# Patient Record
Sex: FEMALE | Race: WHITE | NOT HISPANIC OR LATINO | Employment: FULL TIME | URBAN - METROPOLITAN AREA
[De-identification: names, ages, dates, MRNs, and addresses within clinical notes are randomized per-mention and may not be internally consistent; named-entity substitution may affect disease eponyms.]

---

## 2021-03-23 ENCOUNTER — OFFICE VISIT (OUTPATIENT)
Dept: OBSTETRICS AND GYNECOLOGY | Age: 61
End: 2021-03-23

## 2021-03-23 ENCOUNTER — TELEPHONE (OUTPATIENT)
Dept: OBSTETRICS AND GYNECOLOGY | Age: 61
End: 2021-03-23

## 2021-03-23 VITALS
DIASTOLIC BLOOD PRESSURE: 78 MMHG | HEIGHT: 63 IN | WEIGHT: 150.8 LBS | SYSTOLIC BLOOD PRESSURE: 130 MMHG | BODY MASS INDEX: 26.72 KG/M2

## 2021-03-23 DIAGNOSIS — N39.3 STRESS INCONTINENCE IN FEMALE: Primary | ICD-10-CM

## 2021-03-23 DIAGNOSIS — Z01.419 WELL WOMAN EXAM WITH ROUTINE GYNECOLOGICAL EXAM: ICD-10-CM

## 2021-03-23 DIAGNOSIS — Z12.31 SCREENING MAMMOGRAM, ENCOUNTER FOR: ICD-10-CM

## 2021-03-23 DIAGNOSIS — N94.10 FEMALE DYSPAREUNIA: ICD-10-CM

## 2021-03-23 PROBLEM — M25.569 KNEE PAIN: Status: ACTIVE | Noted: 2020-08-04

## 2021-03-23 PROBLEM — E78.5 HYPERLIPIDEMIA: Status: ACTIVE | Noted: 2017-03-03

## 2021-03-23 PROBLEM — I10 HYPERTENSIVE DISORDER: Status: ACTIVE | Noted: 2021-02-01

## 2021-03-23 PROBLEM — K58.9 IRRITABLE BOWEL SYNDROME: Status: ACTIVE | Noted: 2020-08-18

## 2021-03-23 PROBLEM — I49.8 NODAL RHYTHM DISORDER: Status: ACTIVE | Noted: 2017-02-22

## 2021-03-23 PROBLEM — K80.20 BILIARY CALCULUS: Status: ACTIVE | Noted: 2020-09-03

## 2021-03-23 PROBLEM — G47.9 DISTURBANCE IN SLEEP BEHAVIOR: Status: ACTIVE | Noted: 2020-01-13

## 2021-03-23 PROBLEM — K76.89 OTHER CHRONIC NONALCOHOLIC LIVER DISEASE: Status: ACTIVE | Noted: 2021-03-18

## 2021-03-23 PROBLEM — E55.9 VITAMIN D DEFICIENCY: Status: ACTIVE | Noted: 2017-09-15

## 2021-03-23 PROBLEM — H26.9 CATARACT: Status: ACTIVE | Noted: 2017-02-22

## 2021-03-23 PROBLEM — K83.8 CHOLANGIECTASIS: Status: ACTIVE | Noted: 2020-09-08

## 2021-03-23 PROCEDURE — 99213 OFFICE O/P EST LOW 20 MIN: CPT | Performed by: OBSTETRICS & GYNECOLOGY

## 2021-03-23 PROCEDURE — 99386 PREV VISIT NEW AGE 40-64: CPT | Performed by: OBSTETRICS & GYNECOLOGY

## 2021-03-23 RX ORDER — LOSARTAN POTASSIUM 25 MG/1
TABLET ORAL
COMMUNITY

## 2021-03-23 RX ORDER — HYDROCHLOROTHIAZIDE 12.5 MG/1
TABLET ORAL
COMMUNITY
Start: 2020-11-24

## 2021-03-23 RX ORDER — ESTRADIOL 0.1 MG/G
CREAM VAGINAL
Qty: 42.5 G | Refills: 12 | Status: SHIPPED | OUTPATIENT
Start: 2021-03-23 | End: 2022-03-29

## 2021-03-23 RX ORDER — CONJUGATED ESTROGENS 0.62 MG/G
CREAM VAGINAL
Qty: 30 G | Refills: 12 | Status: SHIPPED | OUTPATIENT
Start: 2021-03-23 | End: 2021-03-23

## 2021-03-23 RX ORDER — METOPROLOL SUCCINATE 25 MG/1
TABLET, EXTENDED RELEASE ORAL
COMMUNITY
Start: 2021-02-01

## 2021-03-23 RX ORDER — HYOSCYAMINE SULFATE 0.12 MG/1
0.12 TABLET SUBLINGUAL
COMMUNITY
Start: 2020-11-24

## 2021-03-23 RX ORDER — TRAZODONE HYDROCHLORIDE 50 MG/1
50 TABLET ORAL
COMMUNITY
End: 2023-04-04

## 2021-03-23 RX ORDER — OMEPRAZOLE 40 MG/1
CAPSULE, DELAYED RELEASE ORAL
COMMUNITY
Start: 2020-11-10 | End: 2023-04-04

## 2021-03-23 NOTE — TELEPHONE ENCOUNTER
Patient was prescribed Premarin Vaginal Cream today, and the pharmacy told her it was $399. Would like another Rx that is cheaper. I contacted her Pharmacist, and he said that all hormone creams are very high priced because of all the cases of cancer.He said that the Rx would last her about 6 mo. He said you could try prescribing Estrace instead and see if her insurance will cover that.

## 2021-03-23 NOTE — PROGRESS NOTES
Routine Annual Visit    3/23/2021    Patient: Connie Rogel          MR#:[id number]      Chief Complaint   Patient presents with   • Gynecologic Exam     AE Today-re establish care, Last AE 2019-negative Dr. Loomis, MG 2019-negative @ [facility] over 10yrs ago, Colonoscopy-2020 Dr. Garza. Pt concerned with bladder leaking and having painful intercourse.     • Establish Care       History of Present Illness    61 y.o. female  who presents for annual exam but also has issues  She has stress incontinence, happens when she coughs, laughs, sneezes but not every time and not every day.  Sometimes wears a pad.  She does not feel as though it is problematic enough to warrant surgery at this time.  She also has dryness of the vagina and pain with intercourse.  She has tightening of the vaginal opening.  She has previously tried stretching exercises on her own but without good success.  She also notes vaginal dryness.  She had previously used vaginal estrogen but had stopped      Health Maintenance  Last pap: 2019 normal at [facility]  Mammogram: 2019, history abnormal: None  Colonoscopy , repeat due 10 yr  Family history of Breast, ovarian, uterine, colon, pancreatic cancer: no    No LMP recorded. Patient is postmenopausal.  Obstetric History:  OB History        3    Para   3    Term   3            AB        Living   3       SAB        TAB        Ectopic        Molar        Multiple        Live Births   3               Menstrual History:     No LMP recorded. Patient is postmenopausal.       ________________________________________  There is no problem list on file for this patient.      Past Medical History:   Diagnosis Date   • IBS (irritable bowel syndrome)        Family History   Problem Relation Age of Onset   • Cancer Mother    • Rheum arthritis Father        Past Surgical History:   Procedure Laterality Date   • GALLBLADDER SURGERY  2020   • TUBAL ABDOMINAL LIGATION         Social  "History     Tobacco Use   Smoking Status Former Smoker   Smokeless Tobacco Never Used       has a current medication list which includes the following prescription(s): hydrochlorothiazide, hyoscyamine sulfate sl, losartan, metoprolol succinate xl, metoprolol tartrate, omeprazole, trazodone, and neomycin-polymyxin-hydrocortisone.  ________________________________________      Review of Systems    Objective   Physical Exam    /78   Ht 160 cm (63\")   Wt 68.4 kg (150 lb 12.8 oz)   Breastfeeding No   BMI 26.71 kg/m²    BP Readings from Last 3 Encounters:   03/23/21 130/78   06/29/16 125/82      Wt Readings from Last 3 Encounters:   03/23/21 68.4 kg (150 lb 12.8 oz)   06/29/16 72.6 kg (160 lb)         BMI: Body mass index is 26.71 kg/m².       General:   alert, appears stated age and cooperative   Neck: No thyromegaly or LAD, no carotid bruit noted   Heart:: regular rate and rhythm, S1, S2 normal, no murmur, click, rub or gallop   Lungs: normal respiratory effort and auscultation   Abdomen: soft, non-tender, without masses or organomegaly   Breast: inspection negative, no nipple discharge or bleeding, no masses or nodularity palpable   Urethra and bladder: urethral meatus normal; bladder nontender to palpation; No cystocele   Vulva: normal, Bartholin's, Urethra, [place]'s normal   Vagina:  Atrophic mucosa   Cervix: no lesions and nulliparous appearance   Uterus: normal size or anteverted   Adnexa: normal adnexa and no mass, fullness, tenderness       Assessment:    normal annual exam   Stress incontinence  Vaginal atrophy  Dyspareunia    Plan:    Plan     [x]  Mammogram request made  []  PAP done up-to-date  []  Labs:   []  GC/Chl/TV  []  DEXA scan   []  Referral for colonoscopy:     Referral made to physical therapy for dyspareunia, introital narrowing to discuss vaginal dilator therapy.  Also can discussed exercises to help with stress incontinence, she declines referral to discuss mid urethral sling  Premarin " sent to the pharmacy for vaginal atrophy and dyspareunia as well    Counseling  [x]  Nutrition  [x]  Physical activity/regular exercise   [x]  Healthy weight  []  Injury prevention  []  Smoking cessation  []  Substance misuse/abuse  [x]  Sexual behavior  []  STD prevention  []  Contraception  []  Dental health  [x]  Mental health  []  Immunization  [x]  Encouraged TIFFANY Norton MD  03/23/2021  09:22 EDT

## 2021-04-30 ENCOUNTER — HOSPITAL ENCOUNTER (OUTPATIENT)
Dept: MAMMOGRAPHY | Facility: HOSPITAL | Age: 61
Discharge: HOME | End: 2021-04-30
Admitting: OBSTETRICS & GYNECOLOGY

## 2021-04-30 DIAGNOSIS — Z12.31 SCREENING MAMMOGRAM, ENCOUNTER FOR: ICD-10-CM

## 2021-04-30 PROCEDURE — 77063 BREAST TOMOSYNTHESIS BI: CPT

## 2021-04-30 PROCEDURE — 77067 SCR MAMMO BI INCL CAD: CPT

## 2022-03-29 ENCOUNTER — OFFICE VISIT (OUTPATIENT)
Dept: OBSTETRICS AND GYNECOLOGY | Age: 62
End: 2022-03-29

## 2022-03-29 VITALS
SYSTOLIC BLOOD PRESSURE: 122 MMHG | WEIGHT: 154.6 LBS | HEIGHT: 63 IN | BODY MASS INDEX: 27.39 KG/M2 | DIASTOLIC BLOOD PRESSURE: 74 MMHG

## 2022-03-29 DIAGNOSIS — Z11.51 SCREENING FOR HUMAN PAPILLOMAVIRUS: ICD-10-CM

## 2022-03-29 DIAGNOSIS — N95.2 VAGINAL ATROPHY: ICD-10-CM

## 2022-03-29 DIAGNOSIS — Z12.31 SCREENING MAMMOGRAM FOR BREAST CANCER: ICD-10-CM

## 2022-03-29 DIAGNOSIS — Z01.419 ENCOUNTER FOR GYNECOLOGICAL EXAMINATION WITHOUT ABNORMAL FINDING: Primary | ICD-10-CM

## 2022-03-29 PROCEDURE — 99396 PREV VISIT EST AGE 40-64: CPT | Performed by: OBSTETRICS & GYNECOLOGY

## 2022-03-29 NOTE — PROGRESS NOTES
Routine Annual Visit    3/29/2022    Patient: Connie Rogel          MR#:[id number]      Chief Complaint   Patient presents with   • Gynecologic Exam     AE Today, Last AE 3/23/2021,  MG 2021, Colonoscopy .        History of Present Illness    62 y.o. female  who presents for annual exam. She has had a lot of other issues this year, knee issues from dog running into her knee. Did PT. Also had gallbladder removed this year.   Dx IBS this year and seeing GI  No longer SA, did not go to PT recommended last year   No vaginal bleeding      No LMP recorded. Patient is postmenopausal.  Obstetric History:  OB History        3    Para   3    Term   3            AB        Living   3       SAB        IAB        Ectopic        Molar        Multiple        Live Births   3               Menstrual History:     No LMP recorded. Patient is postmenopausal.       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   • Ex-smoker   • Gastro-esophageal reflux disease without esophagitis   • Hyperlipidemia   • Hypertensive disorder   • Irritable bowel syndrome   • Knee pain   • Menopause   • [place] rhythm disorder   • Other chronic nonalcoholic liver disease   • Vitamin D deficiency       Past Medical History:   Diagnosis Date   • Hypertension    • IBS (irritable bowel syndrome)        Family History   Problem Relation Age of Onset   • Cancer Mother    • Rheum arthritis Father    • Breast cancer Neg Hx    • Ovarian cancer Neg Hx    • Uterine cancer Neg Hx    • Colon cancer Neg Hx        Past Surgical History:   Procedure Laterality Date   • GALLBLADDER SURGERY  2020   • TUBAL ABDOMINAL LIGATION         Social History     Tobacco Use   Smoking Status Former Smoker   Smokeless Tobacco Never Used       has a current medication list which includes the following prescription(s):  "hydrochlorothiazide, hyoscyamine sulfate sl, losartan, metoprolol succinate xl, metoprolol tartrate, omeprazole, estradiol, neomycin-polymyxin-hydrocortisone, and trazodone.  ________________________________________      Review of Systems   Constitutional: Negative for fever and unexpected weight change.   Respiratory: Negative for shortness of breath.    Cardiovascular: Negative for chest pain.   Gastrointestinal: Negative for abdominal pain, constipation and diarrhea.   Genitourinary: Negative for frequency and urgency.   Musculoskeletal: Positive for arthralgias.   Hematological: Negative for adenopathy.   Psychiatric/Behavioral: Negative for dysphoric mood.       Objective   Physical Exam    /74   Ht 160 cm (63\")   Wt 70.1 kg (154 lb 9.6 oz)   Breastfeeding No   BMI 27.39 kg/m²    BP Readings from Last 3 Encounters:   03/29/22 122/74   03/23/21 130/78   06/29/16 125/82      Wt Readings from Last 3 Encounters:   03/29/22 70.1 kg (154 lb 9.6 oz)   03/23/21 68.4 kg (150 lb 12.8 oz)   06/29/16 72.6 kg (160 lb)         BMI: Body mass index is 27.39 kg/m².       General:   alert, appears stated age and cooperative   Neck: No thyromegaly or LAD   Heart:: regular rate and rhythm, S1, S2 normal, no murmur, click, rub or gallop   Lungs: normal respiratory effort and auscultation   Abdomen: soft, non-tender, without masses or organomegaly   Breast: inspection negative, no nipple discharge or bleeding, no masses or nodularity palpable   Urethra and bladder: urethral meatus normal; bladder nontender to palpation;   Vulva: normal, Bartholin's, Urethra, [place]'s normal   Vagina: atrophic mucosa, normal discharge. no blood   Cervix: multiparous appearance and no lesions   Uterus: normal size and anteverted   Adnexa: normal adnexa and no mass, fullness, tenderness       Assessment:    normal annual exam   Mild stress incontinence  menopause    Plan:    Plan     [x]  Mammogram request made  [x]  PAP done  []  Labs:   []  " GC/Chl/TV  []  DEXA scan   []  Referral for colonoscopy:     Declines to see urogyn for stress incontinence    Counseling  [x]  Nutrition  [x]  Physical activity/regular exercise   [x]  Healthy weight  []  Injury prevention  []  Smoking cessation  []  Substance misuse/abuse  [x]  Sexual behavior  []  STD prevention  []  Contraception  []  Dental health  []  Mental health  []  Immunization  [x]  Encouraged SBE        Yolande Norton MD  03/29/2022  11:01 EDT

## 2022-04-03 LAB
CYTOLOGIST CVX/VAG CYTO: NORMAL
CYTOLOGY CVX/VAG DOC CYTO: NORMAL
CYTOLOGY CVX/VAG DOC THIN PREP: NORMAL
DX ICD CODE: NORMAL
HIV 1 & 2 AB SER-IMP: NORMAL
HPV I/H RISK 4 DNA CVX QL PROBE+SIG AMP: NEGATIVE
OTHER STN SPEC: NORMAL
STAT OF ADQ CVX/VAG CYTO-IMP: NORMAL

## 2022-05-02 ENCOUNTER — HOSPITAL ENCOUNTER (OUTPATIENT)
Dept: MAMMOGRAPHY | Facility: HOSPITAL | Age: 62
Discharge: HOME | End: 2022-05-02
Admitting: OBSTETRICS & GYNECOLOGY

## 2022-05-02 DIAGNOSIS — Z12.31 SCREENING MAMMOGRAM FOR BREAST CANCER: ICD-10-CM

## 2022-05-02 PROCEDURE — 77063 BREAST TOMOSYNTHESIS BI: CPT

## 2022-05-02 PROCEDURE — 77067 SCR MAMMO BI INCL CAD: CPT

## 2023-03-22 ENCOUNTER — TRANSCRIBE ORDERS (OUTPATIENT)
Dept: ADMINISTRATIVE | Facility: HOSPITAL | Age: 63
End: 2023-03-22
Payer: COMMERCIAL

## 2023-03-22 DIAGNOSIS — Z12.31 SCREENING MAMMOGRAM, ENCOUNTER FOR: Primary | ICD-10-CM

## 2023-04-04 ENCOUNTER — OFFICE VISIT (OUTPATIENT)
Dept: OBSTETRICS AND GYNECOLOGY | Age: 63
End: 2023-04-04
Payer: COMMERCIAL

## 2023-04-04 VITALS
BODY MASS INDEX: 28.31 KG/M2 | HEIGHT: 63 IN | WEIGHT: 159.8 LBS | SYSTOLIC BLOOD PRESSURE: 128 MMHG | DIASTOLIC BLOOD PRESSURE: 76 MMHG

## 2023-04-04 DIAGNOSIS — Z01.419 WELL WOMAN EXAM WITH ROUTINE GYNECOLOGICAL EXAM: Primary | ICD-10-CM

## 2023-04-04 PROCEDURE — 99396 PREV VISIT EST AGE 40-64: CPT | Performed by: OBSTETRICS & GYNECOLOGY

## 2023-04-04 RX ORDER — AMITRIPTYLINE HYDROCHLORIDE 25 MG/1
25 TABLET, FILM COATED ORAL
COMMUNITY
Start: 2022-04-20 | End: 2023-04-20

## 2023-04-04 RX ORDER — OMEPRAZOLE 20 MG/1
20 CAPSULE, DELAYED RELEASE ORAL
COMMUNITY
Start: 2022-04-20 | End: 2023-04-04

## 2023-04-04 NOTE — PROGRESS NOTES
Routine Annual Visit    2023    Patient: Connie Rogel          MR#:[id number]      Chief Complaint   Patient presents with   • Gynecologic Exam     AE Today, Last AE 3/29/2022 (-), HPV (-), MG 2022, Colonoscopy 2020       History of Present Illness    63 y.o. female  who presents for annual exam.  She notes that her urinary incontinence has improved.  She has not been sexually active.  She denies any vulvovaginal complaints.    Her  retired this year, he is still figuring out what he is going to do with group home.  She is not planning to retire for a couple more years.    Health Maintenance  Last pap:   Mammogram:   Colonoscopy , repeat due 10 yr  Family history of Breast, ovarian, uterine, colon, pancreatic cancer: no      No LMP recorded. Patient is postmenopausal.  Obstetric History:  OB History        3    Para   3    Term   3            AB        Living   3       SAB        IAB        Ectopic        Molar        Multiple        Live Births   3               Menstrual History:     No LMP recorded. Patient is postmenopausal.       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   • Ex-smoker   • Gastro-esophageal reflux disease without esophagitis   • Hyperlipidemia   • Hypertensive disorder   • Irritable bowel syndrome   • Knee pain   • Menopause   • [place] rhythm disorder   • Other chronic nonalcoholic liver disease   • Vitamin D deficiency       Past Medical History:   Diagnosis Date   • Hypertension    • IBS (irritable bowel syndrome)    • Other irritable bowel syndrome        Family History   Problem Relation Age of Onset   • Cancer Mother    • Rheum arthritis Father    • Breast cancer Neg Hx    • Ovarian cancer Neg Hx    • Uterine cancer Neg Hx    • Colon cancer Neg Hx        Past Surgical History:   Procedure Laterality Date   •  "GALLBLADDER SURGERY  08/2020   • TUBAL ABDOMINAL LIGATION         Social History     Tobacco Use   Smoking Status Former   Smokeless Tobacco Never       has a current medication list which includes the following prescription(s): amitriptyline, hydrochlorothiazide, hyoscyamine sulfate sl, loratadine, losartan, and metoprolol succinate xl.  ________________________________________      Review of Systems   Constitutional: Negative for fever and unexpected weight change.   Respiratory: Negative for shortness of breath.    Cardiovascular: Negative for chest pain.   Gastrointestinal: Negative for abdominal pain, constipation and diarrhea.   Genitourinary: Negative for frequency and urgency.   Hematological: Negative for adenopathy.   Psychiatric/Behavioral: Negative for dysphoric mood.       Objective   Physical Exam    /76   Ht 160 cm (63\")   Wt 72.5 kg (159 lb 12.8 oz)   BMI 28.31 kg/m²    BP Readings from Last 3 Encounters:   04/04/23 128/76   03/29/22 122/74   03/23/21 130/78      Wt Readings from Last 3 Encounters:   04/04/23 72.5 kg (159 lb 12.8 oz)   03/29/22 70.1 kg (154 lb 9.6 oz)   03/23/21 68.4 kg (150 lb 12.8 oz)         BMI: Body mass index is 28.31 kg/m².       General:   alert, appears stated age and cooperative   Neck: No thyromegaly or LAD   Heart:: regular rate and rhythm, S1, S2 normal, no murmur, click, rub or gallop   Lungs: normal respiratory effort and auscultation   Abdomen: soft, non-tender, without masses or organomegaly   Breast: inspection negative, no nipple discharge or bleeding, no masses or nodularity palpable   Urethra and bladder: urethral meatus normal; bladder nontender to palpation;   Vulva: normal, Bartholin's, Urethra, [place]'s normal   Vagina: normal but atrophic mucosa   Cervix: multiparous appearance and no lesions   Uterus: normal size, non-tender and anteverted   Adnexa: normal adnexa and no mass, fullness, tenderness       Assessment:    normal annual exam "   Menopause    Plan:    Plan     [x]  Mammogram request made  []  PAP done  []  Labs:   []  GC/Chl/TV  []  DEXA scan   []  Referral for colonoscopy:     Doing well overall, we had discussed treatment for stress incontinence in the past but she says that things have improved.  She is not currently sexually active.    Counseling  [x]  Nutrition  [x]  Physical activity/regular exercise   [x]  Healthy weight  []  Injury prevention  []  Smoking cessation  []  Substance misuse/abuse  [x]  Sexual behavior  []  STD prevention  []  Contraception  []  Dental health  []  Mental health  []  Immunization  [x]  Encouraged SBE        Yolande Norton MD  04/04/2023  16:11 EDT

## 2023-05-23 ENCOUNTER — HOSPITAL ENCOUNTER (OUTPATIENT)
Dept: MAMMOGRAPHY | Facility: HOSPITAL | Age: 63
Discharge: HOME | End: 2023-05-23
Admitting: OBSTETRICS & GYNECOLOGY
Payer: COMMERCIAL

## 2023-05-23 DIAGNOSIS — Z12.31 SCREENING MAMMOGRAM, ENCOUNTER FOR: ICD-10-CM

## 2023-05-23 PROCEDURE — 77063 BREAST TOMOSYNTHESIS BI: CPT

## 2023-05-23 PROCEDURE — 77067 SCR MAMMO BI INCL CAD: CPT

## 2024-04-11 ENCOUNTER — OFFICE VISIT (OUTPATIENT)
Dept: OBSTETRICS AND GYNECOLOGY | Age: 64
End: 2024-04-11
Payer: COMMERCIAL

## 2024-04-11 VITALS
DIASTOLIC BLOOD PRESSURE: 78 MMHG | BODY MASS INDEX: 28.35 KG/M2 | HEIGHT: 63 IN | SYSTOLIC BLOOD PRESSURE: 132 MMHG | WEIGHT: 160 LBS

## 2024-04-11 DIAGNOSIS — Z13.89 SCREENING FOR BLOOD OR PROTEIN IN URINE: ICD-10-CM

## 2024-04-11 DIAGNOSIS — N94.10 FEMALE DYSPAREUNIA: ICD-10-CM

## 2024-04-11 DIAGNOSIS — N95.2 VAGINAL ATROPHY: ICD-10-CM

## 2024-04-11 DIAGNOSIS — N89.8 VAGINAL IRRITATION: ICD-10-CM

## 2024-04-11 DIAGNOSIS — Z01.419 WELL FEMALE EXAM WITH ROUTINE GYNECOLOGICAL EXAM: Primary | ICD-10-CM

## 2024-04-11 DIAGNOSIS — Z12.31 SCREENING MAMMOGRAM FOR BREAST CANCER: ICD-10-CM

## 2024-04-11 LAB
BILIRUB BLD-MCNC: NEGATIVE MG/DL
GLUCOSE UR STRIP-MCNC: NEGATIVE MG/DL
KETONES UR QL: NEGATIVE
LEUKOCYTE EST, POC: NEGATIVE
NITRITE UR-MCNC: NEGATIVE MG/ML
PH UR: 6 [PH] (ref 5–8)
PROT UR STRIP-MCNC: NEGATIVE MG/DL
RBC # UR STRIP: NEGATIVE /UL
SP GR UR: 1.01 (ref 1–1.03)
UROBILINOGEN UR QL: NORMAL

## 2024-04-11 RX ORDER — CONJUGATED ESTROGENS 0.62 MG/G
CREAM VAGINAL DAILY
Qty: 30 G | Refills: 3 | Status: SHIPPED | OUTPATIENT
Start: 2024-04-11 | End: 2024-04-15

## 2024-04-11 NOTE — PROGRESS NOTES
"Subjective     History of Present Illness    Chief Complaint   Patient presents with    Gynecologic Exam     AE Today, Last pap 3/29/2022 (-), HPV (-), MG 2023, Colonoscopy 2020. Pt c/o burning and vaginal itching        Connie Rogel is a 64 y.o. female who presents for annual exam.  C/o burning with urination x few weeks. Reports vaginal itching as well.  No vaginal odor, vaginal discharge, or other urinary symptoms.   C/o pain with penetration during IC. Has tried lubricants without relief. She is interested in treatment options for this.  Declines STD testing.  Postmenopausal - no vaginal bleeding.      Obstetric History:  OB History          3    Para   3    Term   3            AB        Living   3         SAB        IAB        Ectopic        Molar        Multiple        Live Births   3               Menstrual History:     No LMP recorded. Patient is postmenopausal.           Current contraception: post menopausal status  History of abnormal Pap smear: no  Received Gardasil immunization: no  Perform regular self breast exam: yes -    Family history of uterine or ovarian cancer: no  Family History of colon cancer: no  Family history of breast cancer: no    PAP: up to date - normal/negative   Mammogram: ordered.  Colonoscopy: up to date. - normal , f/u 10 yrs  DEXA: not indicated.    Exercise: moderately active  Calcium/Vitamin D: inadequate intake    The following portions of the patient's history were reviewed and updated as appropriate: allergies, current medications, past family history, past medical history, past social history, past surgical history, and problem list.    Review of Systems  Pertinent items are noted in HPI.       Objective   Physical Exam    /78   Ht 160 cm (63\")   Wt 72.6 kg (160 lb)   BMI 28.34 kg/m²      General: alert, appears stated age, cooperative, and no distress   Heart: regular rate and rhythm, S1, S2 normal, no murmur, click, rub or gallop "   Lungs: clear to auscultation bilaterally   Abdomen: soft, non-tender, without masses or organomegaly   Breast: inspection negative, no nipple discharge or bleeding, no masses or nodularity palpable   External genitalia/Vulva: moderate atrophy, External genitalia including bartholin's glands, Urethra, [place]'s gland and urethra meatus are normal, and Bladder appears normal without significant prolapse    Vagina: normal mucosa, normal discharge   Cervix: no lesions and no cervical motion tenderness   Uterus: normal size and non-tender   Adnexa: normal adnexa and no mass, fullness, tenderness   Neurologic: Alert and Oriented x3   Psychiatric: Normal affect, judgement, and mood       Assessment & Plan   Diagnoses and all orders for this visit:    1. Well female exam with routine gynecological exam (Primary)    2. Screening mammogram for breast cancer  -     Mammo Screening Digital Tomosynthesis Bilateral With CAD; Future    3. Female dyspareunia  -     Ambulatory Referral to Physical Therapy Pelvic Floor    4. Vaginal atrophy  -     Estrogens Conjugated (Premarin) 0.625 MG/GM vaginal cream; Insert  into the vagina Daily for 30 days. Use daily for 2 weeks then taper to 3 times per week  Dispense: 30 g; Refill: 3    5. Vaginal irritation  -     NuSwab BV & Candida - Swab, Vagina    6. Screening for blood or protein in urine  -     POC Urinalysis Dipstick          All questions answered.  Screening mammogram ordered  NuSwab for yeast / BV done today - will call pt with results and treat accordingly  Urinalysis negative for UTI  Breast self exam technique reviewed and patient encouraged to perform self-exam monthly.  Physical activity and regular exercise encouraged.  Discussed healthy lifestyle modifications.  Discussed calcium and vitamin D needs to prevent osteoporosis.  Vaginal atrophy - reviewed findings with patient, she is agreeable to try estrogen cream. Premarin sample given to pt, and prescription sent to  pharmacy.   Dyspareunia - referral for pelvic floor PT placed.      Return in 1 year (on 4/11/2025) for Annual exam.

## 2024-04-12 LAB
A VAGINAE DNA VAG QL NAA+PROBE: NORMAL SCORE
BVAB2 DNA VAG QL NAA+PROBE: NORMAL SCORE
C ALBICANS DNA VAG QL NAA+PROBE: NEGATIVE
C GLABRATA DNA VAG QL NAA+PROBE: NEGATIVE
MEGA1 DNA VAG QL NAA+PROBE: NORMAL SCORE

## 2024-04-15 ENCOUNTER — TELEPHONE (OUTPATIENT)
Dept: OBSTETRICS AND GYNECOLOGY | Age: 64
End: 2024-04-15
Payer: COMMERCIAL

## 2024-04-15 DIAGNOSIS — N95.2 VAGINAL ATROPHY: Primary | ICD-10-CM

## 2024-04-15 RX ORDER — ESTRADIOL 4 UG/1
1 INSERT VAGINAL DAILY
Qty: 18 EACH | Refills: 0 | Status: SHIPPED | OUTPATIENT
Start: 2024-04-15 | End: 2024-04-29

## 2024-04-15 NOTE — TELEPHONE ENCOUNTER
Pt stated she tried to get her prescription from the pharmacy for the Premarin and it is going to cost over $400.00 and would like an alternative called into the pharmacy and was wanting to know if a suppository option is available.

## 2024-04-22 ENCOUNTER — TELEPHONE (OUTPATIENT)
Dept: OBSTETRICS AND GYNECOLOGY | Age: 64
End: 2024-04-22
Payer: COMMERCIAL

## 2024-04-22 NOTE — TELEPHONE ENCOUNTER
Pt called stating the Premarin and Imvexxy are each over $400.  She is asking if there is something else she can get that is not as expensive or if there is a pharmacy that is cheaper that this can be sent to.  Thank you.

## 2024-04-24 DIAGNOSIS — N95.2 VAGINAL ATROPHY: Primary | ICD-10-CM

## 2024-04-24 RX ORDER — ESTRADIOL 0.1 MG/G
CREAM VAGINAL
Qty: 42.5 G | Refills: 4 | Status: SHIPPED | OUTPATIENT
Start: 2024-04-24

## 2024-05-21 ENCOUNTER — HOSPITAL ENCOUNTER (OUTPATIENT)
Dept: PHYSICAL THERAPY | Facility: HOSPITAL | Age: 64
Discharge: HOME | End: 2024-05-21
Payer: COMMERCIAL

## 2024-05-21 DIAGNOSIS — M62.89 PELVIC FLOOR DYSFUNCTION: ICD-10-CM

## 2024-05-21 DIAGNOSIS — R10.2 PELVIC PAIN: Primary | ICD-10-CM

## 2024-05-21 DIAGNOSIS — N39.3 STRESS INCONTINENCE: ICD-10-CM

## 2024-05-21 PROCEDURE — 97161 PT EVAL LOW COMPLEX 20 MIN: CPT

## 2024-05-21 PROCEDURE — 97530 THERAPEUTIC ACTIVITIES: CPT

## 2024-05-21 NOTE — THERAPY EVALUATION
Outpatient Physical Therapy Ortho Initial Evaluation  [facility]     Patient Name: Connie Rogel  : 1960  MRN: [id number]  Today's Date: 2024      Visit Date: 2024    Patient Active Problem List   Diagnosis    Biliary calculus    Cataract    Cholangiectasis    Disturbance in sleep behavior    Ex-smoker    Gastro-esophageal reflux disease without esophagitis    Hyperlipidemia    Hypertensive disorder    Irritable bowel syndrome    Knee pain    Menopause    [place] rhythm disorder    Other chronic nonalcoholic liver disease    Vitamin D deficiency        Past Medical History:   Diagnosis Date    Hypertension     IBS (irritable bowel syndrome)     Other irritable bowel syndrome         Past Surgical History:   Procedure Laterality Date    GALLBLADDER SURGERY  2020    TUBAL ABDOMINAL LIGATION         Visit Dx:     ICD-10-CM ICD-9-CM   1. Pelvic pain  R10.2 [id number]   2. Pelvic floor dysfunction  M62.89 618.83   3. Stress incontinence  N39.3 [id number]          Patient History       Row Name 24 07             Fall Risk Assessment    Any falls in the past year: No  -RS         Services    Are you currently receiving Home Health services No  -RS         Daily Activities    Primary Language English  -RS      Are you able to read Yes  -RS      Are you able to write Yes  -RS      How does patient learn best? Listening;Reading;Demonstration  -RS      Pt Participated in POC and Goals Yes  -RS         Safety    Are you being hurt, hit, or frightened by anyone at home or in your life? No  -RS      Are you being neglected by a caregiver No  -RS                User Key  (r) = Recorded By, (t) = Taken By, (c) = Cosigned By      Initials Name Provider Type    RS Bren Wu PT Physical Therapist                                 Pelvic Health       Row Name 24 0700             Subjective    Patient Reason for Visit Pelvic Pain  -RS      Brief Description of Chief Complaint The pt presents with  "pelvic pain with intercourse that has been present a couple of years. She has not attempted IC in the past 6 months due to pain and bleeding. She has given birth 3x vaginally, denies pelvic pain following delivery. She began menopause at 37 yo. She reports stress urinary incontinence with coughing/sneezing, denies urinary frequency/urgency. She has IBS which causes diarrhea but is pretty well under control. 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 She has started walking about 2.5 miles on a TM per day. She reports intermittent \"pulls\" in her lower back, that improve with activity.  -RS      Patient Goals improve pain, know how to help the symptoms  -RS      Patient Participated in POC and Goals Yes  -RS         Fall Risk Assessment    Any falls in the past year: No  -RS         Services    Are you currently receiving Home Health services No  -RS         Daily Activities    Primary Language English  -RS      Are you able to read Yes  -RS      Are you able to write Yes  -RS      How does patient learn best? Listening;Reading;Demonstration  -RS      Pt Participated in POC and Goals Yes  -RS         Safety    Are you being hurt, hit, or frightened by anyone at home or in your life? No  -RS      Are you being neglected by a caregiver No  -RS         Urinary/Bowel History    Stress incontinence yes  -RS      Urgency no  -RS      Nocturia (times per night) 0  -RS         Pregnancy/Sexual History    Number of Pregnancies 3  -RS      Number of Children 3  -RS      Type of Previous Deliveries Vaginal  -RS      Do you have radicular pain or numbness? No  -RS      Are you currently exercising? Yes  -RS      Pain with initial penetration Yes  -RS         Pelvic Floor Muscle    Patient/Parent/Guardian Consented to Internal Pelvic Floor Exam Yes  -RS      Strength (Right) 3: Squeeze with/without lift  -RS      Strength (Left) 3: Squeeze with/without lift  -RS      Symmetry of Sustained Maximal Contraction " Symmetrical  -RS      Endurance (Ability to Hold Maximal Contraction) 10 sec  -RS      # of Reps of Maximal Contractions while Maintaining Endurance and Strength 3 sets  -RS      Fast Contraction (# of 1 sec contractions performed) 5  -RS      External pelvic floor palpation Superficial Transverse Perineal;Perineal Body  -RS      Superficial Transverse Perineal Left:;Tender  -RS      Perineal Body Left:;Tender  -RS      1st Layer Tone/Internal Palpation TTP introitus, posterior/lateral (L>R)  -RS      2nd Layer Tone/Internal Palpation inc tone L posterior lateral  -RS      3rd Layer Tone/Internal Palpation no significant TTP noted  -RS         Observations    Perineal Observation Performed? Yes  -RS         Observation of Contraction in Perineum    Anal [place] Present  -RS      Perineal Body Lift Present  -RS         Pelvic Floor    Ability to Isolate Contraction of Pelvic Floor Yes  -RS         Cough    Abdominal Contraction Yes  -RS      Leak None  -RS      Contraction of Pelvic Floor No  -RS      Bulge No  -RS      No Response Yes  -RS         Abdominal Assessment    Breathing Pattern chest dominant  -RS         Prolapse (Pop-Q)    Cystocele Stage 0  -RS      Rectocele Stage 0  -RS         Outcome Measures    Outcome Measure Options Female NIH-CPSI  -RS         Female NIH-CPSI    Pain Total 6  -RS      Urinary Symptoms Total 2  -RS      Quality of Life Impact Total 9  -RS      Total Score 17  -RS                User Key  (r) = Recorded By, (t) = Taken By, (c) = Cosigned By      Initials Name Provider Type    RS Bren Wu PT Physical Therapist                    Therapy Education  Education Details: Access Code [email or web address]  Given: HEP  Program: New  How Provided: Verbal, Demonstration, Written  Provided to: Patient  Level of Understanding: Verbalized, Demonstrated      PT OP Goals       Row Name 05/21/24 0800          PT Short Term Goals    STG Date to Achieve 07/05/24  -RS     STG 1 The pt will report  understanding of the use of the knack for ipmroved pressure manaagement.  -RS     STG 1 Progress New  -RS     STG 2 The pt will report IND ith self STM for improved tissue mobility and pelvic pain.  -RS     STG 2 Progress New  -RS        Long Term Goals    LTG Date to Achieve 08/19/24  -RS     LTG 1 The pt will report IND and compliant with HEP focused on IND condition management and return to PLOF.  -RS     LTG 1 Progress New  -RS     LTG 2 The pt will report no more than 2/10 discomfort with penetration to facilitate improved pain minimized performance of intercourse.  -RS     LTG 2 Progress New  -RS     LTG 3 The pt will report at least 75% improvement in JESE symptoms with cough/sneeze to indicate improved pressure managment.  -RS     LTG 3 Progress New  -RS     LTG 4 The pt will tolerate pelvic exam without  significant pain to facilitate improved health maintenance.  -RS     LTG 4 Progress New  -RS        Time Calculation    PT Goal Re-Cert Due Date 08/19/24  -RS               User Key  (r) = Recorded By, (t) = Taken By, (c) = Cosigned By      Initials Name Provider Type    RS Bren Wu, PT Physical Therapist                     PT Assessment/Plan       Row Name 05/21/24 0800          PT Assessment    Functional Limitations Limitation in home management;Limitations in community activities;Performance in self-care ADL;Performance in work activities  -RS     Impairments Impaired muscle length;Impaired muscle power;Impaired muscle endurance;Range of motion;Posture;Peripheral nerve integrity;Pain;Muscle strength  -RS     Assessment Comments Connie Rogel is a 64 y.o. female referred to physical therapy for pelvic pain with intercourse. She presents with an unstable clinical presentation, along with no remarkable comorbidities and personal factors of chronicity of pain that may impact her progress in the plan of care. Pt presents today with impaired pelvic floor dysfunction, increased tone and tenderness to  palpation posterior/L lateral pelvic floor layers 1-2, decreased PFM strength/power  . her signs and symptoms are consistent with referring diagnosis. The previous impairments limit her ability to tolerate intercourse, pelvic exam, cough/sneeze without JESE. The pt self scores 17 on the female NIH CPSI.Pt will benefit from skilled PT to address the previous impairments and return to PLOF.  -RS     Please refer to paper survey for additional self-reported information No  -RS     Rehab Potential Good  -RS     Patient/caregiver participated in establishment of treatment plan and goals Yes  -RS     Patient would benefit from skilled therapy intervention Yes  -RS        PT Plan    PT Frequency 1x/week  -RS     Predicted Duration of Therapy Intervention (PT) 6-8 sessions  -RS     Planned CPT's? PT RE-EVAL: 26531;PT THER PROC EA 15 MIN: 90789;PT THER ACT EA 15 MIN: 63431;PT MANUAL THERAPY EA 15 MIN: 07500;PT NEUROMUSC RE-EDUCATION EA 15 MIN: 70773;PT GAIT TRAINING EA 15 MIN: 94426;PT SELF CARE/HOME MGMT/TRAIN EA 15: 73060;PT HOT OR COLD PACK TREAT MCARE;PT ELECTRICAL STIM UNATTEND: ;PT TRACTION LUMBAR: 22435;PT EVAL LOW COMPLEXITY: 99059  -RS     PT Plan Comments Consider manual therapy, focus on ford breathing, how was perineal massage?, educate the knack,PFM strength, look at hip strenngth  -RS               User Key  (r) = Recorded By, (t) = Taken By, (c) = Cosigned By      Initials Name Provider Type    RS Bren Wu, PT Physical Therapist                       OP Exercises       Row Name 05/21/24 0800 05/21/24 0700          Subjective    Patient Reason for Visit -- Pelvic Pain  -RS     Brief Description of Chief Complaint -- The pt presents with pelvic pain with intercourse that has been present a couple of years. She has not attempted IC in the past 6 months due to pain and bleeding. She has given birth 3x vaginally, denies pelvic pain following delivery. She began menopause at 37 yo. She reports stress  "urinary incontinence with coughing/sneezing, denies urinary frequency/urgency. She has IBS which causes diarrhea but is pretty well under control. 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 She has started walking about 2.5 miles on a TM per day. She reports intermittent \"pulls\" in her lower back, that improve with activity.  -RS     Patient Goals -- improve pain, know how to help the symptoms  -RS     Patient Participated in POC and Goals -- Yes  -RS        Total Minutes    50912 - PT Therapeutic Activity Minutes 15  -RS --        Exercise 1    Exercise Name 1 perineal massage education- wand or thumb  -RS --               User Key  (r) = Recorded By, (t) = Taken By, (c) = Cosigned By      Initials Name Provider Type    RS Bren Wu PT Physical Therapist                                            Time Calculation:     Start Time: 0745  Stop Time: 0830  Time Calculation (min): 45 min  Timed Charges  40912 - PT Therapeutic Activity Minutes: 15  Untimed Charges  PT Eval/Re-eval Minutes: 28  Total Minutes  Timed Charges Total Minutes: 15  Untimed Charges Total Minutes: 28   Total Minutes: 28     Therapy Charges for Today       Code Description Service Date Service Provider Modifiers Qty    95005835525  PT THERAPEUTIC ACT EA 15 MIN 5/21/2024 Bren Wu, PT GP 1    02689472530 HC PT EVAL LOW COMPLEXITY 2 5/21/2024 Bren Wu, PT GP 1                      Bren Wu PT  5/21/2024        "

## 2024-05-28 ENCOUNTER — HOSPITAL ENCOUNTER (OUTPATIENT)
Dept: MAMMOGRAPHY | Facility: HOSPITAL | Age: 64
Discharge: HOME | End: 2024-05-28
Payer: COMMERCIAL

## 2024-05-28 DIAGNOSIS — Z12.31 SCREENING MAMMOGRAM FOR BREAST CANCER: ICD-10-CM

## 2024-05-28 PROCEDURE — 77067 SCR MAMMO BI INCL CAD: CPT

## 2024-05-28 PROCEDURE — 77063 BREAST TOMOSYNTHESIS BI: CPT

## 2024-05-29 DIAGNOSIS — R92.8 ABNORMALITY OF RIGHT BREAST ON SCREENING MAMMOGRAM: Primary | ICD-10-CM

## 2024-06-13 ENCOUNTER — HOSPITAL ENCOUNTER (OUTPATIENT)
Dept: PHYSICAL THERAPY | Facility: HOSPITAL | Age: 64
Discharge: HOME | End: 2024-06-13
Payer: COMMERCIAL

## 2024-06-13 DIAGNOSIS — R10.2 PELVIC PAIN: Primary | ICD-10-CM

## 2024-06-13 DIAGNOSIS — N39.3 STRESS INCONTINENCE: ICD-10-CM

## 2024-06-13 DIAGNOSIS — M62.89 PELVIC FLOOR DYSFUNCTION: ICD-10-CM

## 2024-06-13 PROCEDURE — 97530 THERAPEUTIC ACTIVITIES: CPT

## 2024-06-13 PROCEDURE — 97110 THERAPEUTIC EXERCISES: CPT

## 2024-06-25 ENCOUNTER — APPOINTMENT (OUTPATIENT)
Dept: ULTRASOUND IMAGING | Facility: HOSPITAL | Age: 64
End: 2024-06-25
Payer: COMMERCIAL

## 2024-06-25 ENCOUNTER — HOSPITAL ENCOUNTER (OUTPATIENT)
Dept: MAMMOGRAPHY | Facility: HOSPITAL | Age: 64
Discharge: HOME | End: 2024-06-25
Payer: COMMERCIAL

## 2024-06-25 DIAGNOSIS — R92.8 ABNORMALITY OF RIGHT BREAST ON SCREENING MAMMOGRAM: ICD-10-CM

## 2024-06-25 PROCEDURE — 77065 DX MAMMO INCL CAD UNI: CPT

## 2024-06-25 PROCEDURE — G0279 TOMOSYNTHESIS, MAMMO: HCPCS

## 2024-07-11 ENCOUNTER — HOSPITAL ENCOUNTER (OUTPATIENT)
Dept: PHYSICAL THERAPY | Facility: HOSPITAL | Age: 64
Discharge: HOME | End: 2024-07-11
Payer: COMMERCIAL

## 2024-07-11 DIAGNOSIS — R10.2 PELVIC PAIN: Primary | ICD-10-CM

## 2024-07-11 DIAGNOSIS — M62.89 PELVIC FLOOR DYSFUNCTION: ICD-10-CM

## 2024-07-11 DIAGNOSIS — N39.3 STRESS INCONTINENCE: ICD-10-CM

## 2024-07-11 PROCEDURE — 97140 MANUAL THERAPY 1/> REGIONS: CPT

## 2024-07-11 PROCEDURE — 97110 THERAPEUTIC EXERCISES: CPT

## 2024-07-11 PROCEDURE — 97530 THERAPEUTIC ACTIVITIES: CPT

## 2024-07-11 NOTE — THERAPY PROGRESS REPORT/RE-CERT
Outpatient Physical Therapy Ortho Progress Note  [facility]     Patient Name: Connie Rogel  : 1960  MRN: [id number]  Today's Date: 2024      Visit Date: 2024    Visit Dx:    ICD-10-CM ICD-9-CM   1. Pelvic pain  R10.2 [id number]   2. Stress incontinence  N39.3 [id number]   3. Pelvic floor dysfunction  M62.89 618.83       Patient Active Problem List   Diagnosis    Biliary calculus    Cataract    Cholangiectasis    Disturbance in sleep behavior    Ex-smoker    Gastro-esophageal reflux disease without esophagitis    Hyperlipidemia    Hypertensive disorder    Irritable bowel syndrome    Knee pain    Menopause    [place] rhythm disorder    Other chronic nonalcoholic liver disease    Vitamin D deficiency        Past Medical History:   Diagnosis Date    Hypertension     IBS (irritable bowel syndrome)     Other irritable bowel syndrome         Past Surgical History:   Procedure Laterality Date    GALLBLADDER SURGERY  2020    TUBAL ABDOMINAL LIGATION                      Pelvic Health       Row Name 24 1000             Pelvic Floor Muscle    Patient/Parent/Guardian Consented to Internal Pelvic Floor Exam Yes  -RS      Strength (Right) 3: Squeeze with/without lift  -RS      Strength (Left) 3: Squeeze with/without lift  -RS      Symmetry of Sustained Maximal Contraction Symmetrical  -RS      Superficial Transverse Perineal --  no significant TTP reported  -RS      Perineal Body --  no significant TTP reported  -RS      3rd Layer Tone/Internal Palpation TTP posterior levator ani, OI, L slightly increased compared to R  -RS      External Pelvic Floor Comments difficulty lengthening with inhale  -RS                User Key  (r) = Recorded By, (t) = Taken By, (c) = Cosigned By      Initials Name Provider Type    RS Bren Wu PT Physical Therapist                     PT Assessment/Plan       Row Name 24 1000          PT Assessment    Functional Limitations Limitation in home  management;Limitations in community activities;Performance in self-care ADL;Performance in work activities  -RS     Impairments Impaired muscle length;Impaired muscle power;Impaired muscle endurance;Range of motion;Posture;Peripheral nerve integrity;Pain;Muscle strength  -RS     Assessment Comments Pt has been seen by PT for 3 total sessions focused on pelvic pain. She has met or partially met 2/2 STG and 0/4 LTG. She reports significant decrease in perineal pain however continues to note deeper pelvic pain. Re-assessed pelvic floor this date and noted increased muscle tone in levator ani bilaterally and L obturator internus. Performed manual therapy focused on improved posterior pelvic floor muscle tone and tolerance for palpation. Initiated hip IR stretching and ajay pose with emphasis on diaphragmatic breathing and posterior pelvic floor mobility with good tolerance. Updated HEP and reviewed with pt who reports understanding and remains appropriate for skilled PT.  -RS        PT Plan    PT Plan Comments How was wand?, deeper pelvic pain,  -RS               User Key  (r) = Recorded By, (t) = Taken By, (c) = Cosigned By      Initials Name Provider Type    RS Bren Wu, PT Physical Therapist                       OP Exercises       Row Name 07/11/24 1000             Subjective    Subjective Comments Pt reports perineal pain is improved but deeper pain near her cervix remains  -RS         Total Minutes    74716 - PT Therapeutic Exercise Minutes 10  -RS      76673 - PT Therapeutic Activity Minutes 15  -RS      79011 - PT Manual Therapy Minutes 15  -RS         Exercise 1    Exercise Name 1 education- use of pelvic wand for self STM 3rd layer  -RS         Exercise 2    Exercise Name 2 cat cow hip IR  -RS      Cueing 2 Verbal;Demo  -RS      Reps 2 10  -RS      Time 2 5  -RS         Exercise 3    Exercise Name 3 frog stretch  -RS      Cueing 3 Verbal;Demo  -RS      Reps 3 10  -RS      Time 3 5 sec  -RS          "Exercise 4    Exercise Name 4 ajay pose stretch  -RS      Cueing 4 Verbal;Demo  -RS      Reps 4 3  -RS      Time 4 20  -RS         Exercise 5    Exercise Name 5 bridge with PFM  -RS      Cueing 5 Verbal;Demo  -RS      Sets 5 2  -RS      Reps 5 10  -RS         Exercise 6    Exercise Name 6 sl clam  -RS      Cueing 6 Verbal;Demo  -RS      Sets 6 2  -RS      Reps 6 10  -RS      Time 6 RTB  -RS         Exercise 7    Exercise Name 7 review of goals/progress/POC/HEP  -RS                User Key  (r) = Recorded By, (t) = Taken By, (c) = Cosigned By      Initials Name Provider Type    RS Bren Wu, PT Physical Therapist                             Manual Rx (Last 36 Hours)       Manual Treatments       Row Name 07/11/24 1000             Total Minutes    85094 - PT Manual Therapy Minutes 15  -RS         Manual Rx 1    Manual Rx 1 Location levator ani, L OI sustained pressure and sweeping  -RS                User Key  (r) = Recorded By, (t) = Taken By, (c) = Cosigned By      Initials Name Provider Type    RS Bren Wu, PT Physical Therapist                     PT OP Goals       Row Name 07/11/24 0700          PT Short Term Goals    STG Date to Achieve 07/05/24  -RS (r) VS (t) RS (c)     STG 1 The pt will report understanding of the use of the knack for ipmroved pressure manaagement.  -RS (r) VS (t) RS (c)     STG 1 Progress Partially Met  -RS (r) VS (t) RS (c)     STG 1 Progress Comments able to use when prepared for it, had one instance which caught her \"off guard\"  -RS     STG 2 The pt will report IND ith self STM for improved tissue mobility and pelvic pain.  -RS (r) VS (t) RS (c)     STG 2 Progress Met  -RS (r) VS (t) RS (c)        Long Term Goals    LTG Date to Achieve 08/19/24  -RS (r) VS (t) RS (c)     LTG 1 The pt will report IND and compliant with HEP focused on IND condition management and return to PLOF.  -RS (r) VS (t) RS (c)     LTG 1 Progress Ongoing;Progressing  -RS (r) VS (t) RS (c)     LTG 1 " "Progress Comments updated this date  -RS     LTG 2 The pt will report no more than 2/10 discomfort with penetration to facilitate improved pain minimized performance of intercourse.  -RS (r) VS (t) RS (c)     LTG 2 Progress Progressing  -RS     LTG 2 Progress Comments perineal pain improved, deeper pain remains  -RS     LTG 3 The pt will report at least 75% improvement in JESE symptoms with cough/sneeze to indicate improved pressure managment.  -RS (r) VS (t) RS (c)     LTG 3 Progress Ongoing  -RS (r) VS (t) RS (c)     LTG 3 Progress Comments improved when \"prepared\" but present with larger or \"surprise\" sneezes  -RS     LTG 4 The pt will tolerate pelvic exam without  significant pain to facilitate improved health maintenance.  -RS (r) VS (t) RS (c)     LTG 4 Progress Ongoing;Progressing  -RS (r) VS (t) RS (c)               User Key  (r) = Recorded By, (t) = Taken By, (c) = Cosigned By      Initials Name Provider Type    RS Bren Wu, PT Physical Therapist    VS Seema Ruvalcaba PT Student PT Student                    Therapy Education  Given: HEP  Program: Reinforced, Progressed  How Provided: Verbal, Demonstration, Written  Provided to: Patient  Level of Understanding: Verbalized, Demonstrated              Time Calculation:   Start Time: 0745  Stop Time: 0829  Time Calculation (min): 44 min  Timed Charges  20129 - PT Therapeutic Exercise Minutes: 10  47005 - PT Manual Therapy Minutes: 15  58979 - PT Therapeutic Activity Minutes: 15  Total Minutes  Timed Charges Total Minutes: 40   Total Minutes: 40  Therapy Charges for Today       Code Description Service Date Service Provider Modifiers Qty    10335681961 HC PT THER PROC EA 15 MIN 7/11/2024 Bren Wu, PT GP 1    61614329678 HC PT THERAPEUTIC ACT EA 15 MIN 7/11/2024 Bren Wu, PT GP 1    38683340527 HC PT MANUAL THERAPY EA 15 MIN 7/11/2024 Bren Wu, PT GP 1                      Bren Wu PT  7/11/2024     "

## 2024-08-23 NOTE — THERAPY TREATMENT NOTE
Outpatient Physical Therapy Ortho Treatment Note  [facility]     Patient Name: Connie Rogel  : 1960  MRN: [id number]  Today's Date: 2024      Visit Date: 2024    Visit Dx:    ICD-10-CM ICD-9-CM   1. Pelvic pain  R10.2 [id number]   2. Stress incontinence  N39.3 [id number]   3. Pelvic floor dysfunction  M62.89 618.83       Patient Active Problem List   Diagnosis    Biliary calculus    Cataract    Cholangiectasis    Disturbance in sleep behavior    Ex-smoker    Gastro-esophageal reflux disease without esophagitis    Hyperlipidemia    Hypertensive disorder    Irritable bowel syndrome    Knee pain    Menopause    [place] rhythm disorder    Other chronic nonalcoholic liver disease    Vitamin D deficiency        Past Medical History:   Diagnosis Date    Hypertension     IBS (irritable bowel syndrome)     Other irritable bowel syndrome         Past Surgical History:   Procedure Laterality Date    GALLBLADDER SURGERY  2020    TUBAL ABDOMINAL LIGATION                          PT Assessment/Plan       Row Name 24 0800          PT Assessment    Assessment Comments Pt returns for first follow up session after initial eval reporting good compliance with perineal massage and 50% reduciton in pain. Reviewed perineal massage and initiated hip mobility and stability activities. Pt with slightly decreased L hip flexion with pinching anteriorly however no significant pain. Initiated clamshell,  bridge, hip adduction stretching, piriformis stretch, and quick flicks with educationr egarding the knack. Updated HEP and reviewed with pt who reports understanding and remains appropriate for skilled PT.  -RS        PT Plan    PT Plan Comments Review perineal massage, consider STS with PFM, side steps  -RS               User Key  (r) = Recorded By, (t) = Taken By, (c) = Cosigned By      Initials Name Provider Type    RS Bren Wu PT Physical Therapist                       OP Exercises       Row Name 24  0800             Subjective    Subjective Comments Pt reports perineal massage ahs been going well, it is 50% better  -RS         Total Minutes    69136 - PT Therapeutic Exercise Minutes 25  -RS      68434 - PT Therapeutic Activity Minutes 13  -RS         Exercise 1    Exercise Name 1 review of perineal massage  -RS      Additional Comments 50% less pain than before  -RS         Exercise 2    Exercise Name 2 diaphragmatic breathing  -RS      Time 2 2 min  -RS         Exercise 3    Exercise Name 3 piriformis stretch  -RS      Cueing 3 Verbal;Demo  -RS      Reps 3 3  -RS      Time 3 20  -RS         Exercise 4    Exercise Name 4 seated hip adduction  -RS      Cueing 4 Verbal;Demo  -RS      Reps 4 3  -RS      Time 4 20s  -RS         Exercise 5    Exercise Name 5 pelvic floor quick flick  -RS      Cueing 5 Verbal;Demo  -RS      Sets 5 2  -RS      Reps 5 10  -RS         Exercise 6    Exercise Name 6 bridge with pelvic floor  -RS      Cueing 6 Verbal;Demo  -RS      Sets 6 2  -RS      Reps 6 10  -RS      Time 6 3  -RS         Exercise 7    Exercise Name 7 clamshell  -RS      Cueing 7 Verbal;Demo  -RS      Sets 7 2  -RS      Reps 7 12  -RS      Time 7 GTB  -RS         Exercise 8    Exercise Name 8 quick flicks/ the knack  -RS      Cueing 8 Verbal;Demo  -RS      Sets 8 2  -RS      Reps 8 10  -RS                User Key  (r) = Recorded By, (t) = Taken By, (c) = Cosigned By      Initials Name Provider Type    RS Bren Wu, PT Physical Therapist                                  PT OP Goals       Row Name 06/13/24 0700          PT Short Term Goals    STG Date to Achieve 07/05/24  -RS     STG 1 The pt will report understanding of the use of the knack for ipmroved pressure manaagement.  -RS     STG 1 Progress Partially Met  -RS     STG 1 Progress Comments educated this date  -RS     STG 2 The pt will report IND ith self STM for improved tissue mobility and pelvic pain.  -RS     STG 2 Progress Ongoing  -RS        Long Term  Post op pain - Pts request Goals    LTG Date to Achieve 08/19/24  -RS     LTG 1 The pt will report IND and compliant with HEP focused on IND condition management and return to PLOF.  -RS     LTG 1 Progress Ongoing  -RS     LTG 2 The pt will report no more than 2/10 discomfort with penetration to facilitate improved pain minimized performance of intercourse.  -RS     LTG 2 Progress Ongoing  -RS     LTG 3 The pt will report at least 75% improvement in JESE symptoms with cough/sneeze to indicate improved pressure managment.  -RS     LTG 3 Progress Ongoing  -RS     LTG 4 The pt will tolerate pelvic exam without  significant pain to facilitate improved health maintenance.  -RS     LTG 4 Progress Ongoing  -RS               User Key  (r) = Recorded By, (t) = Taken By, (c) = Cosigned By      Initials Name Provider Type    RS Bren Wu PT Physical Therapist                    Therapy Education  Given: HEP  Program: Reinforced, Progressed  How Provided: Verbal, Demonstration, Written  Provided to: Patient  Level of Understanding: Verbalized, Demonstrated              Time Calculation:   Start Time: 0748  Stop Time: 0828  Time Calculation (min): 40 min  Timed Charges  73349 - PT Therapeutic Exercise Minutes: 25  01957 - PT Therapeutic Activity Minutes: 13  Total Minutes  Timed Charges Total Minutes: 38   Total Minutes: 38  Therapy Charges for Today       Code Description Service Date Service Provider Modifiers Qty    [id number]  PT THER PROC EA 15 MIN 6/13/2024 Bren Wu, PT GP 2    [id number]  PT THERAPEUTIC ACT EA 15 MIN 6/13/2024 Bren Wu, PT GP 1                      Bren Wu PT  6/13/2024      cervical myelopathy

## 2025-04-16 ENCOUNTER — OFFICE VISIT (OUTPATIENT)
Dept: OBSTETRICS AND GYNECOLOGY | Age: 65
End: 2025-04-16
Payer: MEDICARE

## 2025-04-16 VITALS
WEIGHT: 169 LBS | HEIGHT: 63 IN | DIASTOLIC BLOOD PRESSURE: 74 MMHG | BODY MASS INDEX: 29.95 KG/M2 | SYSTOLIC BLOOD PRESSURE: 112 MMHG

## 2025-04-16 DIAGNOSIS — Z01.419 WELL FEMALE EXAM WITH ROUTINE GYNECOLOGICAL EXAM: Primary | ICD-10-CM

## 2025-04-16 DIAGNOSIS — N95.2 VAGINAL ATROPHY: ICD-10-CM

## 2025-04-16 DIAGNOSIS — Z13.820 OSTEOPOROSIS SCREENING: ICD-10-CM

## 2025-04-16 DIAGNOSIS — Z78.0 POSTMENOPAUSE: ICD-10-CM

## 2025-04-16 DIAGNOSIS — Z11.51 SCREENING FOR HUMAN PAPILLOMAVIRUS (HPV): ICD-10-CM

## 2025-04-16 DIAGNOSIS — N39.3 SUI (STRESS URINARY INCONTINENCE, FEMALE): ICD-10-CM

## 2025-04-16 DIAGNOSIS — Z12.4 SCREENING FOR MALIGNANT NEOPLASM OF CERVIX: ICD-10-CM

## 2025-04-16 DIAGNOSIS — Z12.31 SCREENING MAMMOGRAM FOR BREAST CANCER: ICD-10-CM

## 2025-04-16 RX ORDER — ESTRADIOL 0.1 MG/G
CREAM VAGINAL
Qty: 42.5 G | Refills: 4 | Status: SHIPPED | OUTPATIENT
Start: 2025-04-16

## 2025-04-16 NOTE — PROGRESS NOTES
Subjective     History of Present Illness    Chief Complaint   Patient presents with    Gynecologic Exam     Ae- last pap 3/29/2022 Neg, Hpv Neg, mammo 2024, colonoscopy 2020       Connie Rogel is a 65 y.o. female  who presents for annual exam.  Doing well, no complaints.  Using vaginal Estrace cream, has helped with dryness.  Request refills.  Some JESE when sneezing, she went to pelvic floor PT a few times last year and exercises have helped this.  No PMB.  Pap smear today. Last pap smear in  was normal/negative.   Screening mammogram due in May 2025. F/u R breast diagnostic mammogram last year was negative.  Has not had a DEXA scan.   Social - retired a few months ago. May move back to [place].      Relevant data reviewed:  Mammo Diagnostic Digital Tomosynthesis Right With CAD (2024 09:20)   Mammo Screening Digital Tomosynthesis Bilateral With CAD (2024 11:25)   IGP, Apt HPV,rfx 16 / 18,45 (2022 13:37)     Obstetric History:  OB History          3    Para   3    Term   3            AB        Living   3         SAB        IAB        Ectopic        Molar        Multiple        Live Births   3               Menstrual History:     No LMP recorded. Patient is postmenopausal.           Current contraception: post menopausal status  History of abnormal Pap smear: no  Received Gardasil immunization: no  Perform regular self breast exam: yes -    Family history of uterine or ovarian cancer: no  Family History of colon cancer: no  Family history of breast cancer: no    PAP: done today  Mammogram: ordered  Colonoscopy: up to date - normal in , f/u 10 yrs  DEXA: ordered    Exercise: moderately active  Calcium/Vitamin D: adequate intake and uses supplements    The following portions of the patient's history were reviewed and updated as appropriate: allergies, current medications, past family history, past medical history, past social history, past surgical history, and  "problem list.    Review of Systems  Pertinent items are noted in HPI.       Objective   Physical Exam    /74   Ht 160 cm (63\")   Wt 76.7 kg (169 lb)   BMI 29.94 kg/m²      General: alert, appears stated age, cooperative, and no distress   Heart: regular rate and rhythm, S1, S2 normal, no murmur, click, rub or gallop   Lungs: clear to auscultation bilaterally   Abdomen: soft, non-tender, without masses or organomegaly   Breast: inspection negative, no nipple discharge or bleeding, no masses or nodularity palpable   External genitalia/Vulva: External genitalia including bartholin's glands, Urethra, Bunnell's gland and urethra meatus are normal and Bladder appears normal without significant prolapse    Vagina: normal mucosa, normal discharge   Cervix: no lesions   Uterus: normal size and non-tender   Adnexa: normal adnexa and no mass, fullness, tenderness   Neurologic: Alert and Oriented x3   Psychiatric: Normal affect, judgement, and mood       Assessment & Plan   Diagnoses and all orders for this visit:    1. Well female exam with routine gynecological exam (Primary)  -     IGP, Apt HPV,rfx 16 / 18,45    2. Screening for human papillomavirus (HPV)  -     IGP, Apt HPV,rfx 16 / 18,45    3. Screening for malignant neoplasm of cervix  -     IGP, Apt HPV,rfx 16 / 18,45    4. Screening mammogram for breast cancer  -     Mammo Screening Digital Tomosynthesis Bilateral With CAD; Future    5. Postmenopause  -     DEXA Bone Density Axial; Future    6. Osteoporosis screening  -     DEXA Bone Density Axial; Future    7. Vaginal atrophy  -     estradiol (ESTRACE VAGINAL) 0.1 MG/GM vaginal cream; Apply 1 grams intravaginally 2-3 times  weekly  Dispense: 42.5 g; Refill: 4    8. JESE (stress urinary incontinence, female)          PAP smear with HPV co-testing completed today  Screening mammogram ordered  Vaginal estrace cream refills sent  DEXA scan ordered  Will call patient with results and treat accordingly.   All questions " answered.  Breast self exam technique reviewed and patient encouraged to perform self-exam monthly.  Physical activity and regular exercise encouraged.  Discussed healthy lifestyle modifications.  Discussed calcium and vitamin D needs to prevent osteoporosis.      Return in about 2 years (around 4/16/2027) for Medicare Annual Exam.       Kiki Cramer PA-C  4/16/2025 10:28 EDT

## 2025-04-19 LAB
CYTOLOGIST CVX/VAG CYTO: NORMAL
CYTOLOGY CVX/VAG DOC CYTO: NORMAL
CYTOLOGY CVX/VAG DOC THIN PREP: NORMAL
DX ICD CODE: NORMAL
HPV I/H RISK 4 DNA CVX QL PROBE+SIG AMP: NEGATIVE
OTHER STN SPEC: NORMAL
SERVICE CMNT-IMP: NORMAL
STAT OF ADQ CVX/VAG CYTO-IMP: NORMAL

## 2025-06-26 ENCOUNTER — HOSPITAL ENCOUNTER (OUTPATIENT)
Dept: MAMMOGRAPHY | Facility: HOSPITAL | Age: 65
Discharge: HOME | End: 2025-06-26
Payer: MEDICARE

## 2025-06-26 ENCOUNTER — HOSPITAL ENCOUNTER (OUTPATIENT)
Dept: BONE DENSITY | Facility: HOSPITAL | Age: 65
Discharge: HOME | End: 2025-06-26
Payer: MEDICARE

## 2025-06-26 DIAGNOSIS — Z12.31 SCREENING MAMMOGRAM FOR BREAST CANCER: Primary | ICD-10-CM

## 2025-06-26 DIAGNOSIS — Z13.820 OSTEOPOROSIS SCREENING: ICD-10-CM

## 2025-06-26 DIAGNOSIS — Z78.0 POSTMENOPAUSE: ICD-10-CM

## 2025-06-26 DIAGNOSIS — Z12.31 SCREENING MAMMOGRAM FOR BREAST CANCER: ICD-10-CM

## 2025-06-26 PROCEDURE — 77063 BREAST TOMOSYNTHESIS BI: CPT

## 2025-06-26 PROCEDURE — 77067 SCR MAMMO BI INCL CAD: CPT

## 2025-06-26 PROCEDURE — 77080 DXA BONE DENSITY AXIAL: CPT
